# Patient Record
Sex: FEMALE | URBAN - METROPOLITAN AREA
[De-identification: names, ages, dates, MRNs, and addresses within clinical notes are randomized per-mention and may not be internally consistent; named-entity substitution may affect disease eponyms.]

---

## 2021-03-31 ENCOUNTER — APPOINTMENT (RX ONLY)
Dept: URBAN - METROPOLITAN AREA CLINIC 77 | Facility: CLINIC | Age: 46
Setting detail: DERMATOLOGY
End: 2021-03-31

## 2021-03-31 DIAGNOSIS — L63.8 OTHER ALOPECIA AREATA: ICD-10-CM

## 2021-03-31 PROCEDURE — ? PRESCRIPTION

## 2021-03-31 PROCEDURE — ? COUNSELING

## 2021-03-31 PROCEDURE — 99203 OFFICE O/P NEW LOW 30 MIN: CPT

## 2021-03-31 ASSESSMENT — LOCATION DETAILED DESCRIPTION DERM
LOCATION DETAILED: RIGHT SUPERIOR PARIETAL SCALP
LOCATION DETAILED: LEFT CENTRAL PARIETAL SCALP
LOCATION DETAILED: LEFT SUPERIOR MEDIAL FOREHEAD

## 2021-03-31 ASSESSMENT — LOCATION SIMPLE DESCRIPTION DERM
LOCATION SIMPLE: LEFT FOREHEAD
LOCATION SIMPLE: SCALP

## 2021-03-31 ASSESSMENT — LOCATION ZONE DERM
LOCATION ZONE: SCALP
LOCATION ZONE: FACE

## 2021-06-16 ENCOUNTER — RX ONLY (OUTPATIENT)
Age: 46
Setting detail: RX ONLY
End: 2021-06-16

## 2021-09-14 ENCOUNTER — RX ONLY (OUTPATIENT)
Age: 46
Setting detail: RX ONLY
End: 2021-09-14

## 2021-09-14 ENCOUNTER — APPOINTMENT (RX ONLY)
Dept: URBAN - METROPOLITAN AREA CLINIC 77 | Facility: CLINIC | Age: 46
Setting detail: DERMATOLOGY
End: 2021-09-14

## 2021-09-14 DIAGNOSIS — L64.8 OTHER ANDROGENIC ALOPECIA: ICD-10-CM | Status: INADEQUATELY CONTROLLED

## 2021-09-14 PROCEDURE — 99213 OFFICE O/P EST LOW 20 MIN: CPT

## 2021-09-14 PROCEDURE — ? PRESCRIPTION

## 2021-09-14 PROCEDURE — ? COUNSELING

## 2021-09-14 PROCEDURE — ? TREATMENT REGIMEN

## 2021-09-14 RX ORDER — PHARMACY COMPOUNDING ACCESSORY
EACH MISCELLANEOUS
Qty: 50 | Refills: 3 | Status: ERX | COMMUNITY
Start: 2021-09-14

## 2021-09-14 RX ORDER — PHARMACY COMPOUNDING ACCESSORY
EACH MISCELLANEOUS
Qty: 50 | Refills: 5 | Status: ERX

## 2021-09-14 RX ADMIN — Medication: at 00:00

## 2021-09-14 ASSESSMENT — LOCATION ZONE DERM: LOCATION ZONE: SCALP

## 2021-09-14 ASSESSMENT — LOCATION SIMPLE DESCRIPTION DERM
LOCATION SIMPLE: LEFT SCALP
LOCATION SIMPLE: POSTERIOR SCALP

## 2021-09-14 ASSESSMENT — LOCATION DETAILED DESCRIPTION DERM
LOCATION DETAILED: POSTERIOR MID-PARIETAL SCALP
LOCATION DETAILED: LEFT MEDIAL FRONTAL SCALP

## 2021-09-14 NOTE — PROCEDURE: TREATMENT REGIMEN
Detail Level: Zone
Plan: Location: Scalp\\nPrescribe: Minoxidil 7%, finasteride 0.1%, pyridoxine 1%, hydrocortisone 1% solution, Finasteride 5mg\\nCurrently using Compound: 10% minoxidil, 0.01% latanoprost, 0.2% biotin (Dr. Avila), Spironolactone \\n\\nPatient is here for a six month follow up and had previously received tx from Dr. Avila \\nShe was prescribed a compound solution drop that contained 10% minoxidil, 0.01% latanoprost, 0.2% biotin\\nShe is also on spironolactone prescribed by another physician and doesn’t recall the dose\\nShe isn’t sure if there is improvement with her hair and wanted further evaluation \\nPatient informed me in the past she had a biopsy done in which it showed non scarring alopecia\\nInstructed patient to contact office where she got biopsy report done and sign a medical release form so our office can have access to biopsy report \\nIf her biopsy report is unclear and she is unable to get report, will take a new biopsy to get a definitive diagnosis\\n\\nDiscussed with patient that Finasteride blocks 5 alpha reductase and decreases levels of Dihydrotestosterone levels to prevent miniaturization of the hair follicle.\\nDiscussed that all the side effects are temporary and if they do occur we can reduce the dose of the medication or stop it----no evidence of increased incidence of permanent impotence.\\n\\nDiscussed with pt that if she gets pregnant, to discontinue medication and contact office.\\nDiscussed with pt that being on medication while pregnant can have a possible risk of genital defects in developing male babies.\\n\\nEducated patient on side effects/risks from medication and what to expect from using topical compound hairloss medication.\\n\\nTreatment plan:\\n1) instructed to use topical that Dr. Avila had her on in the morning \\n2) the new compound drops that contain finasteride, instructed to apply at night\\n3) continue taking spironolactone as instructed and to call office to let us know what dose she is on\\n\\nDiscussed with her that she has adult female acne that occurs when there is a fluctuation of hormones\\nWe will keep her on Spironolactone that blocks the hormonal receptors at the skin and hair and works to decrease the inflammatory acne\\n---Educated to not take while pregnant and to watch for orthostatic hypotension which can develop if there is too much diuretic effect\\n\\nF/u 3 months

## 2021-12-13 ENCOUNTER — RX ONLY (OUTPATIENT)
Age: 46
Setting detail: RX ONLY
End: 2021-12-13

## 2021-12-13 RX ORDER — SPIRONOLACTONE 100 MG/1
TABLET, FILM COATED ORAL
Qty: 90 | Refills: 2 | Status: ERX | COMMUNITY
Start: 2021-12-13

## 2022-01-19 ENCOUNTER — APPOINTMENT (RX ONLY)
Dept: URBAN - METROPOLITAN AREA CLINIC 45 | Facility: CLINIC | Age: 47
Setting detail: DERMATOLOGY
End: 2022-01-19

## 2022-01-19 ENCOUNTER — RX ONLY (OUTPATIENT)
Age: 47
Setting detail: RX ONLY
End: 2022-01-19

## 2022-01-19 DIAGNOSIS — L64.8 OTHER ANDROGENIC ALOPECIA: ICD-10-CM | Status: INADEQUATELY CONTROLLED

## 2022-01-19 PROCEDURE — 99213 OFFICE O/P EST LOW 20 MIN: CPT

## 2022-01-19 PROCEDURE — ? TREATMENT REGIMEN

## 2022-01-19 PROCEDURE — ? PRESCRIPTION

## 2022-01-19 PROCEDURE — ? COUNSELING

## 2022-01-19 RX ORDER — PHARMACY COMPOUNDING ACCESSORY
EACH MISCELLANEOUS
Qty: 50 | Refills: 12 | Status: ERX

## 2022-01-19 RX ORDER — SPIRONOLACTONE 100 MG/1
TABLET, FILM COATED ORAL
Qty: 180 | Refills: 6 | Status: ERX

## 2022-01-19 ASSESSMENT — LOCATION SIMPLE DESCRIPTION DERM
LOCATION SIMPLE: POSTERIOR SCALP
LOCATION SIMPLE: LEFT SCALP

## 2022-01-19 ASSESSMENT — LOCATION ZONE DERM: LOCATION ZONE: SCALP

## 2022-01-19 ASSESSMENT — LOCATION DETAILED DESCRIPTION DERM
LOCATION DETAILED: LEFT MEDIAL FRONTAL SCALP
LOCATION DETAILED: POSTERIOR MID-PARIETAL SCALP

## 2022-01-19 NOTE — PROCEDURE: TREATMENT REGIMEN
Detail Level: Zone
Plan: Location: Scalp\\nCurrently using Compound: 10% minoxidil, 0.01% latanoprost, 0.2% biotin,  Spironolactone 100mg, Minoxidil 7%, finasteride 0.1%, pyridoxine 1%, hydrocortisone 1% solution\\n\\nPatient is here for a follow up after using both topical compound solution drops and taking the Spironolactone daily \\nShe mentioned that she has noticed white flaking after using the solution and would finish out the medication within two weeks \\nInformed patient that she is over using the medication and should apply a thin layer evenly to her scalp \\nInformed her that this treatment is a good start at maintaining the hair that she has \\n\\nPatient informed me in the past she had a biopsy done in which it showed non scarring alopecia\\nInstructed patient to contact office where she got biopsy report done and sign a medical release form so our office can have access to biopsy report \\nIf her biopsy report is unclear and she is unable to get report, will take a new biopsy to get a definitive diagnosis\\n\\nDiscussed with patient that Finasteride blocks 5 alpha reductase and decreases levels of Dihydrotestosterone levels to prevent miniaturization of the hair follicle.\\nDiscussed that all the side effects are temporary and if they do occur we can reduce the dose of the medication or stop it----no evidence of increased incidence of permanent impotence.\\n\\nDiscussed with pt that if she gets pregnant, to discontinue medication and contact office.\\nDiscussed with pt that being on medication while pregnant can have a possible risk of genital defects in developing male babies.\\n\\nWe will keep her on Spironolactone that blocks the hormonal receptors at the skin and hair and works to decrease the inflammatory acne\\n---Educated to not take while pregnant and to watch for orthostatic hypotension which can develop if there is too much diuretic effect\\n\\nInstructed to continue using both topicals and will have her follow up one year

## 2023-02-28 ENCOUNTER — RX ONLY (OUTPATIENT)
Age: 48
Setting detail: RX ONLY
End: 2023-02-28

## 2023-02-28 ENCOUNTER — APPOINTMENT (RX ONLY)
Dept: URBAN - METROPOLITAN AREA CLINIC 77 | Facility: CLINIC | Age: 48
Setting detail: DERMATOLOGY
End: 2023-02-28

## 2023-02-28 DIAGNOSIS — L64.8 OTHER ANDROGENIC ALOPECIA: ICD-10-CM

## 2023-02-28 PROCEDURE — ? TREATMENT REGIMEN

## 2023-02-28 PROCEDURE — ? PRESCRIPTION

## 2023-02-28 PROCEDURE — 99213 OFFICE O/P EST LOW 20 MIN: CPT

## 2023-02-28 PROCEDURE — ? COUNSELING

## 2023-02-28 RX ORDER — SPIRONOLACTONE 100 MG/1
TABLET, FILM COATED ORAL
Qty: 180 | Refills: 6 | Status: CANCELLED
Stop reason: CLARIF

## 2023-02-28 RX ORDER — SPIRONOLACTONE 100 MG/1
TABLET, FILM COATED ORAL
Qty: 180 | Refills: 10 | Status: ERX

## 2023-02-28 RX ORDER — PHARMACY COMPOUNDING ACCESSORY
EACH MISCELLANEOUS
Qty: 50 | Refills: 12 | Status: ERX

## 2023-02-28 RX ORDER — PHARMACY COMPOUNDING ACCESSORY
EACH MISCELLANEOUS
Qty: 50 | Refills: 12 | Status: CANCELLED
Stop reason: CLARIF

## 2023-02-28 ASSESSMENT — LOCATION SIMPLE DESCRIPTION DERM
LOCATION SIMPLE: POSTERIOR SCALP
LOCATION SIMPLE: LEFT SCALP

## 2023-02-28 ASSESSMENT — LOCATION DETAILED DESCRIPTION DERM
LOCATION DETAILED: LEFT MEDIAL FRONTAL SCALP
LOCATION DETAILED: POSTERIOR MID-PARIETAL SCALP

## 2023-02-28 ASSESSMENT — LOCATION ZONE DERM: LOCATION ZONE: SCALP

## 2023-02-28 NOTE — PROCEDURE: TREATMENT REGIMEN
Detail Level: Zone
Plan: Location: Scalp\\nCurrently using Compound: 10% minoxidil, 0.01% latanoprost, 0.2% biotin,  Spironolactone 100mg, Minoxidil 7%, finasteride 0.1%, pyridoxine 1%, hydrocortisone 1% solution\\n\\n2/28/23\\nPatient is here for a follow up after a year \\nPt states she needs refills on all her RX. She was using both topical compound solution drops and taking the Spironolactone daily \\nInformed the pt we will continue with her regimen on using both topical droplets and take spironolactone daily \\n\\nDiscussed with patient that Finasteride blocks 5 alpha reductase and decreases levels of Dihydrotestosterone levels to prevent miniaturization of the hair follicle.\\nDiscussed that all the side effects are temporary and if they do occur we can reduce the dose of the medication or stop it----no evidence of increased incidence of permanent impotence.\\n\\nDiscussed with pt that if she gets pregnant, to discontinue medication and contact office.\\nDiscussed with pt that being on medication while pregnant can have a possible risk of genital defects in developing male babies.\\n\\nWe will keep her on Spironolactone that blocks the hormonal receptors at the skin and hair and works to decrease the inflammatory acne\\n---Educated to not take while pregnant and to watch for orthostatic hypotension which can develop if there is too much diuretic effect\\n\\nInstructed to continue using both topicals and will have her follow up one year

## 2024-04-24 ENCOUNTER — APPOINTMENT (RX ONLY)
Dept: URBAN - METROPOLITAN AREA CLINIC 77 | Facility: CLINIC | Age: 49
Setting detail: DERMATOLOGY
End: 2024-04-24

## 2024-04-24 DIAGNOSIS — L64.8 OTHER ANDROGENIC ALOPECIA: ICD-10-CM

## 2024-04-24 PROCEDURE — ? TREATMENT REGIMEN

## 2024-04-24 PROCEDURE — ? COUNSELING

## 2024-04-24 PROCEDURE — ? PRESCRIPTION

## 2024-04-24 PROCEDURE — 99213 OFFICE O/P EST LOW 20 MIN: CPT

## 2024-04-24 RX ORDER — SPIRONOLACTONE 100 MG/1
TABLET, FILM COATED ORAL
Qty: 180 | Refills: 14 | Status: ERX

## 2024-04-24 RX ORDER — PHARMACY COMPOUNDING ACCESSORY
EACH MISCELLANEOUS
Qty: 50 | Refills: 12 | Status: ERX

## 2024-04-24 ASSESSMENT — LOCATION SIMPLE DESCRIPTION DERM
LOCATION SIMPLE: LEFT SCALP
LOCATION SIMPLE: POSTERIOR SCALP

## 2024-04-24 ASSESSMENT — LOCATION DETAILED DESCRIPTION DERM
LOCATION DETAILED: POSTERIOR MID-PARIETAL SCALP
LOCATION DETAILED: LEFT MEDIAL FRONTAL SCALP

## 2024-04-24 ASSESSMENT — LOCATION ZONE DERM: LOCATION ZONE: SCALP

## 2024-04-24 NOTE — PROCEDURE: TREATMENT REGIMEN
Detail Level: Zone
Plan: Location: Scalp\\nCurrently using Compound: 10% minoxidil, 0.01% latanoprost, 0.2% biotin,  Spironolactone 100mg, Minoxidil 7%, finasteride 0.1%, pyridoxine 1%, hydrocortisone 1% solution\\n\\n04/24/2024\\nPhotos taken\\n\\nPatient is here for a follow up after a year on her hair loss\\nPt states she needs refills on all her RX. She was using both topical compound solution drops and taking the Spironolactone daily \\nPt states she uses the Latanoprost compound drops in the morning and the pyridoxine compound drops at night \\nInformed the pt we will continue with her regimen on using both topical droplets and take spironolactone daily \\n\\nDiscussed with patient that Finasteride blocks 5 alpha reductase and decreases levels of Dihydrotestosterone levels to prevent miniaturization of the hair follicle.\\nDiscussed that all the side effects are temporary and if they do occur we can reduce the dose of the medication or stop it----no evidence of increased incidence of permanent impotence.\\n\\nDiscussed with pt that if she gets pregnant, to discontinue medication and contact office.\\nDiscussed with pt that being on medication while pregnant can have a possible risk of genital defects in developing male babies.\\n\\nWe will keep her on Spironolactone that blocks the hormonal receptors at the skin and hair and works to decrease the inflammatory acne\\n---Educated to not take while pregnant and to watch for orthostatic hypotension which can develop if there is too much diuretic effect\\n\\nInstructed to continue using both topicals and will have her follow up one year

## 2025-01-28 ENCOUNTER — RX ONLY (RX ONLY)
Age: 50
End: 2025-01-28

## 2025-01-28 RX ORDER — SPIRONOLACTONE 100 MG/1
TABLET, FILM COATED ORAL
Qty: 180 | Refills: 0 | Status: ERX

## 2025-03-17 ENCOUNTER — APPOINTMENT (OUTPATIENT)
Dept: URBAN - METROPOLITAN AREA CLINIC 77 | Facility: CLINIC | Age: 50
Setting detail: DERMATOLOGY
End: 2025-03-17

## 2025-03-17 DIAGNOSIS — L21.8 OTHER SEBORRHEIC DERMATITIS: ICD-10-CM

## 2025-03-17 DIAGNOSIS — L64.8 OTHER ANDROGENIC ALOPECIA: ICD-10-CM

## 2025-03-17 PROCEDURE — ? COUNSELING

## 2025-03-17 PROCEDURE — ? TREATMENT REGIMEN

## 2025-03-17 PROCEDURE — ? PRESCRIPTION

## 2025-03-17 PROCEDURE — 99214 OFFICE O/P EST MOD 30 MIN: CPT

## 2025-03-17 RX ORDER — CLOBETASOL PROPIONATE 0.05 G/100ML
SHAMPOO TOPICAL
Qty: 118 | Refills: 3 | Status: ERX | COMMUNITY
Start: 2025-03-17

## 2025-03-17 RX ORDER — SPIRONOLACTONE 100 MG/1
TABLET, FILM COATED ORAL
Qty: 180 | Refills: 14 | Status: ERX

## 2025-03-17 RX ORDER — HYDROCORTISONE ACETATE, IODOQUINOL 19; 10 MG/G; MG/G
CREAM TOPICAL
Qty: 2 | Refills: 3 | Status: ERX | COMMUNITY
Start: 2025-03-17

## 2025-03-17 RX ORDER — FINASTERIDE 1 MG/1
TABLET, FILM COATED ORAL
Qty: 90 | Refills: 10 | Status: ERX | COMMUNITY
Start: 2025-03-17

## 2025-03-17 RX ORDER — PHARMACY COMPOUNDING ACCESSORY
EACH MISCELLANEOUS
Qty: 50 | Refills: 12 | Status: ERX

## 2025-03-17 RX ORDER — PHARMACY COMPOUNDING ACCESSORY
EACH MISCELLANEOUS
Qty: 50 | Refills: 12 | Status: ERX | COMMUNITY
Start: 2025-03-17

## 2025-03-17 RX ADMIN — FINASTERIDE: 1 TABLET, FILM COATED ORAL at 00:00

## 2025-03-17 RX ADMIN — CLOBETASOL PROPIONATE: 0.05 SHAMPOO TOPICAL at 00:00

## 2025-03-17 RX ADMIN — HYDROCORTISONE ACETATE, IODOQUINOL: 19; 10 CREAM TOPICAL at 00:00

## 2025-03-17 RX ADMIN — Medication: at 00:00

## 2025-03-17 ASSESSMENT — LOCATION ZONE DERM: LOCATION ZONE: SCALP

## 2025-03-17 ASSESSMENT — LOCATION DETAILED DESCRIPTION DERM
LOCATION DETAILED: RIGHT SUPERIOR PARIETAL SCALP
LOCATION DETAILED: POSTERIOR MID-PARIETAL SCALP
LOCATION DETAILED: LEFT MEDIAL FRONTAL SCALP

## 2025-03-17 ASSESSMENT — LOCATION SIMPLE DESCRIPTION DERM
LOCATION SIMPLE: LEFT SCALP
LOCATION SIMPLE: POSTERIOR SCALP
LOCATION SIMPLE: SCALP

## 2025-03-17 NOTE — PROCEDURE: TREATMENT REGIMEN
Detail Level: Zone
Plan: Location: Scalp\\nCurrently using Compound: 10% minoxidil, 0.01% latanoprost, 0.2% biotin,  Spironolactone 100mg, Minoxidil 7%, finasteride 0.1%, pyridoxine 1%, hydrocortisone 1% solution\\n\\nLOV 04/24/2024\\nPhotos taken\\n\\nPatient is here for a follow up after a year on her hair loss\\nPt states she needs refills on all her RX. She was using both topical compound solution drops and taking the Spironolactone daily \\nPt states she uses the Latanoprost compound drops in the morning and the pyridoxine compound drops at night \\nPT FELT THINNING OF HER HAIR IN THE FRONTAL SCALP FOR THE FIRST TIMES SINCE STARTING ON THE COMPOUND DROPS \\nShe comes today for further evaluation and continuation of treatment \\n\\nDiscussed with pt \\nUpon comparing photos with pt, there is not a significance of loss with her using both topical droplets and take spironolactone daily \\nDiscussed with pt for us to add Finasteride  \\nWill prescribe Finasteride 5mg po QD (take 1/4 a day) to help regrow hair.\\nPT AGREES \\nDiscussed with patient that Finasteride blocks 5 alpha reductase and decreases levels of Dihydrotestosterone levels to prevent miniaturization of the hair follicle.\\nDiscussed that all the side effects are temporary and if they do occur we can reduce the dose of the medication or stop it----no evidence of increased incidence of permanent impotence.\\n\\nDiscussed with pt that if she gets pregnant, to discontinue medication and contact office.\\nDiscussed with pt that being on medication while pregnant can have a possible risk of genital defects in developing male babies.\\n\\nDiscussed with her that hair loss may be due to fluctuation of hormones.\\nDiscussed with pt that we will start her on Spironolactone that helps block the receptors from attacking her skin.\\n\\nWe will keep her on Spironolactone that blocks the hormonal receptors at the skin and hair and works to decrease the inflammatory acne\\n---Educated to not take while pregnant and to watch for orthostatic hypotension which can develop if there is too much diuretic effect\\n\\nInstructed to continue using both topicals and will have her follow up one year
Plan: Location: scalp\\nPrescribe: Ketoconazole 2% shampoo and Clobetasol 0.05% shampoo\\n\\nDiscussed with patient that this irritation, dry scaly rash is an immune mediated condition that can be triggered or exacerbated by several factors such as lack of sleep, stress, allergies, or illness.\\nThere is also a genetic component which we discussed can be helped by taking good care of the skin with a barrier cream and avoiding harsh products.\\n\\nWill prescribe Ketoconazole and Clobetasol shampoo x 30 days to help relieve inflammation.\\nRecommend taking Allegra 180mg daily to decrease histamine level and eliminate this trigger.\\nF/u as needed.

## 2025-08-27 ENCOUNTER — APPOINTMENT (OUTPATIENT)
Dept: URBAN - METROPOLITAN AREA CLINIC 77 | Facility: CLINIC | Age: 50
Setting detail: DERMATOLOGY
End: 2025-08-27

## 2025-08-27 DIAGNOSIS — L53.8 OTHER SPECIFIED ERYTHEMATOUS CONDITIONS: ICD-10-CM

## 2025-08-27 DIAGNOSIS — L85.3 XEROSIS CUTIS: ICD-10-CM

## 2025-08-27 DIAGNOSIS — L64.8 OTHER ANDROGENIC ALOPECIA: ICD-10-CM

## 2025-08-27 PROCEDURE — ? PRESCRIPTION

## 2025-08-27 PROCEDURE — ? COUNSELING

## 2025-08-27 PROCEDURE — ? TREATMENT REGIMEN

## 2025-08-27 PROCEDURE — ? BIOPSY BY PUNCH METHOD

## 2025-08-27 RX ORDER — SPIRONOLACTONE 100 MG/1
TABLET, FILM COATED ORAL
Qty: 180 | Refills: 14 | Status: ACTIVE

## 2025-08-27 RX ORDER — PHARMACY COMPOUNDING ACCESSORY
EACH MISCELLANEOUS
Qty: 50 | Refills: 12 | Status: ACTIVE

## 2025-08-27 RX ORDER — FINASTERIDE 1 MG/1
TABLET, FILM COATED ORAL
Qty: 90 | Refills: 10 | Status: ACTIVE

## 2025-08-27 ASSESSMENT — LOCATION DETAILED DESCRIPTION DERM
LOCATION DETAILED: LEFT THENAR EMINENCE
LOCATION DETAILED: LEFT MEDIAL FRONTAL SCALP
LOCATION DETAILED: MEDIAL FRONTAL SCALP
LOCATION DETAILED: POSTERIOR MID-PARIETAL SCALP
LOCATION DETAILED: LEFT MEDIAL FRONTAL SCALP

## 2025-08-27 ASSESSMENT — LOCATION SIMPLE DESCRIPTION DERM
LOCATION SIMPLE: LEFT HAND
LOCATION SIMPLE: LEFT SCALP
LOCATION SIMPLE: POSTERIOR SCALP
LOCATION SIMPLE: LEFT SCALP
LOCATION SIMPLE: FRONTAL SCALP

## 2025-08-27 ASSESSMENT — LOCATION ZONE DERM
LOCATION ZONE: SCALP
LOCATION ZONE: SCALP
LOCATION ZONE: HAND